# Patient Record
Sex: FEMALE | HISPANIC OR LATINO | URBAN - METROPOLITAN AREA
[De-identification: names, ages, dates, MRNs, and addresses within clinical notes are randomized per-mention and may not be internally consistent; named-entity substitution may affect disease eponyms.]

---

## 2017-02-22 ENCOUNTER — NEW PATIENT (OUTPATIENT)
Dept: URBAN - METROPOLITAN AREA CLINIC 24 | Facility: CLINIC | Age: 77
End: 2017-02-22
Payer: MEDICARE

## 2017-02-22 PROCEDURE — 92020 GONIOSCOPY: CPT | Performed by: OPHTHALMOLOGY

## 2017-02-22 PROCEDURE — 92004 COMPRE OPH EXAM NEW PT 1/>: CPT | Performed by: OPHTHALMOLOGY

## 2017-02-22 PROCEDURE — 92083 EXTENDED VISUAL FIELD XM: CPT | Performed by: OPHTHALMOLOGY

## 2017-02-22 PROCEDURE — 92250 FUNDUS PHOTOGRAPHY W/I&R: CPT | Performed by: OPHTHALMOLOGY

## 2017-02-22 PROCEDURE — 76514 ECHO EXAM OF EYE THICKNESS: CPT | Performed by: OPHTHALMOLOGY

## 2017-02-22 RX ORDER — TIMOLOL MALEATE 5 MG/ML
0.5 % SOLUTION/ DROPS OPHTHALMIC
Qty: 3 | Refills: 3 | Status: INACTIVE
Start: 2017-02-22 | End: 2021-01-22

## 2017-02-22 RX ORDER — LATANOPROST 50 UG/ML
0.005 % SOLUTION OPHTHALMIC
Qty: 3 | Refills: 3 | Status: ACTIVE
Start: 2017-02-22

## 2017-02-22 RX ORDER — BRIMONIDINE TARTRATE 2 MG/ML
0.2 % SOLUTION/ DROPS OPHTHALMIC
Qty: 3 | Refills: 3 | Status: ACTIVE
Start: 2017-02-22

## 2017-02-22 ASSESSMENT — INTRAOCULAR PRESSURE
OD: 16
OS: 18

## 2019-11-14 ENCOUNTER — FOLLOW UP ESTABLISHED (OUTPATIENT)
Dept: URBAN - METROPOLITAN AREA CLINIC 24 | Facility: CLINIC | Age: 79
End: 2019-11-14
Payer: MEDICARE

## 2019-11-14 DIAGNOSIS — H26.491 OTHER SECONDARY CATARACT, RIGHT EYE: ICD-10-CM

## 2019-11-14 DIAGNOSIS — H40.1132 PRIMARY OPEN-ANGLE GLAUCOMA, BILATERAL, MODERATE STAGE: Primary | ICD-10-CM

## 2019-11-14 PROCEDURE — 92134 CPTRZ OPH DX IMG PST SGM RTA: CPT | Performed by: OPTOMETRIST

## 2019-11-14 PROCEDURE — 92014 COMPRE OPH EXAM EST PT 1/>: CPT | Performed by: OPTOMETRIST

## 2019-11-14 ASSESSMENT — VISUAL ACUITY
OD: 20/20
OS: 20/50

## 2019-11-14 ASSESSMENT — KERATOMETRY
OD: 45.28
OS: 45.73

## 2019-12-02 ENCOUNTER — FOLLOW UP ESTABLISHED (OUTPATIENT)
Dept: URBAN - METROPOLITAN AREA CLINIC 24 | Facility: CLINIC | Age: 79
End: 2019-12-02
Payer: MEDICARE

## 2019-12-02 DIAGNOSIS — H25.812 COMBINED FORMS OF AGE-RELATED CATARACT, LEFT EYE: ICD-10-CM

## 2019-12-02 PROCEDURE — 92083 EXTENDED VISUAL FIELD XM: CPT | Performed by: OPTOMETRIST

## 2019-12-02 PROCEDURE — 92133 CPTRZD OPH DX IMG PST SGM ON: CPT | Performed by: OPTOMETRIST

## 2019-12-02 PROCEDURE — 92012 INTRM OPH EXAM EST PATIENT: CPT | Performed by: OPTOMETRIST

## 2019-12-02 RX ORDER — LEVOTHYROXINE SODIUM 200 UG/1
CAPSULE ORAL
Qty: 0 | Refills: 0 | Status: INACTIVE
Start: 2019-12-02 | End: 2020-01-02

## 2019-12-02 RX ORDER — METFORMIN HYDROCHLORIDE 500 MG/1
500 MG TABLET, FILM COATED ORAL
Qty: 0 | Refills: 0 | Status: INACTIVE
Start: 2019-12-02 | End: 2020-01-02

## 2019-12-02 ASSESSMENT — INTRAOCULAR PRESSURE
OD: 12
OS: 14

## 2020-01-02 ENCOUNTER — Encounter (OUTPATIENT)
Dept: URBAN - METROPOLITAN AREA CLINIC 24 | Facility: CLINIC | Age: 80
End: 2020-01-02
Payer: MEDICARE

## 2020-01-02 DIAGNOSIS — Z01.818 ENCOUNTER FOR OTHER PREPROCEDURAL EXAMINATION: Primary | ICD-10-CM

## 2020-01-02 PROCEDURE — 99213 OFFICE O/P EST LOW 20 MIN: CPT | Performed by: PHYSICIAN ASSISTANT

## 2020-01-02 PROCEDURE — 92025 CPTRIZED CORNEAL TOPOGRAPHY: CPT | Performed by: OPHTHALMOLOGY

## 2020-01-08 ENCOUNTER — FOLLOW UP ESTABLISHED (OUTPATIENT)
Dept: URBAN - METROPOLITAN AREA CLINIC 24 | Facility: CLINIC | Age: 80
End: 2020-01-08
Payer: MEDICARE

## 2020-01-08 DIAGNOSIS — H34.8322 TRIBUTARY (BRANCH) RETINAL VEIN OCCLUSION, LEFT EYE, STABLE: ICD-10-CM

## 2020-01-08 PROCEDURE — 92014 COMPRE OPH EXAM EST PT 1/>: CPT | Performed by: OPHTHALMOLOGY

## 2020-01-08 RX ORDER — OFLOXACIN 3 MG/ML
0.3 % SOLUTION/ DROPS OPHTHALMIC
Qty: 1 | Refills: 1 | Status: INACTIVE
Start: 2020-01-08 | End: 2022-01-14

## 2020-01-08 RX ORDER — PREDNISOLONE ACETATE 10 MG/ML
1 % SUSPENSION/ DROPS OPHTHALMIC
Qty: 1 | Refills: 1 | Status: INACTIVE
Start: 2020-01-08 | End: 2022-01-14

## 2020-01-08 RX ORDER — DICLOFENAC SODIUM 1 MG/ML
0.1 % SOLUTION/ DROPS OPHTHALMIC
Qty: 1 | Refills: 1 | Status: ACTIVE
Start: 2020-01-08

## 2020-01-08 ASSESSMENT — INTRAOCULAR PRESSURE
OD: 17
OS: 18

## 2020-01-15 ENCOUNTER — SURGERY (OUTPATIENT)
Dept: URBAN - METROPOLITAN AREA SURGERY 12 | Facility: SURGERY | Age: 80
End: 2020-01-15
Payer: MEDICARE

## 2020-01-15 PROCEDURE — 66984 XCAPSL CTRC RMVL W/O ECP: CPT | Performed by: OPHTHALMOLOGY

## 2020-01-16 ENCOUNTER — POST OP (OUTPATIENT)
Dept: URBAN - METROPOLITAN AREA CLINIC 24 | Facility: CLINIC | Age: 80
End: 2020-01-16

## 2020-01-16 PROCEDURE — 99024 POSTOP FOLLOW-UP VISIT: CPT | Performed by: OPTOMETRIST

## 2020-01-16 ASSESSMENT — INTRAOCULAR PRESSURE: OS: 12

## 2020-10-23 ENCOUNTER — OFFICE VISIT (OUTPATIENT)
Dept: URBAN - METROPOLITAN AREA CLINIC 27 | Facility: CLINIC | Age: 80
End: 2020-10-23
Payer: MEDICARE

## 2020-10-23 DIAGNOSIS — H35.372 PUCKERING OF MACULA, LEFT EYE: ICD-10-CM

## 2020-10-23 PROCEDURE — 92134 CPTRZ OPH DX IMG PST SGM RTA: CPT | Performed by: OPHTHALMOLOGY

## 2020-10-23 PROCEDURE — 92014 COMPRE OPH EXAM EST PT 1/>: CPT | Performed by: OPHTHALMOLOGY

## 2020-10-23 PROCEDURE — 67210 TREATMENT OF RETINAL LESION: CPT | Performed by: OPHTHALMOLOGY

## 2020-10-23 ASSESSMENT — INTRAOCULAR PRESSURE
OD: 15
OS: 14

## 2020-10-23 NOTE — IMPRESSION/PLAN
Impression: BRVO, OS. Status: Symptomatic.
s/p Focal laser last 06/19/2020 
s/p Avastin x 57 last 08/21/2020(consented 02/07/2020) Plan: Exam and OCT reveal mild extrafoveal CME OS (231 microns, from 345 microns). An IVFA from 02/07/2020 demonstrated no NVE. Fundus Photos from 02/07/2020 demonstrated no NVD. Recommend Avastin with focal laser series. R/B/A discussed with patient. The patient elects to proceed with Focal laser today. Thanks, Mckay Rodas. 

Return in 1 week for Avastin OS, and in 8 weeks for Re-evaluation of CME, OCT OU

## 2020-10-23 NOTE — IMPRESSION/PLAN
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 C/D 0.5, OU Plan: IOP acceptable. Continue to coordinate care. Lumigan OS. Timolol bid.

## 2020-10-30 ENCOUNTER — PROCEDURE (OUTPATIENT)
Dept: URBAN - METROPOLITAN AREA CLINIC 27 | Facility: CLINIC | Age: 80
End: 2020-10-30
Payer: MEDICARE

## 2020-10-30 PROCEDURE — 67028 INJECTION EYE DRUG: CPT | Performed by: OPHTHALMOLOGY

## 2020-10-30 ASSESSMENT — INTRAOCULAR PRESSURE
OD: 19
OS: 14

## 2020-12-11 ENCOUNTER — OFFICE VISIT (OUTPATIENT)
Dept: URBAN - METROPOLITAN AREA CLINIC 27 | Facility: CLINIC | Age: 80
End: 2020-12-11
Payer: MEDICARE

## 2020-12-11 PROCEDURE — 99024 POSTOP FOLLOW-UP VISIT: CPT | Performed by: OPHTHALMOLOGY

## 2020-12-11 PROCEDURE — 67028 INJECTION EYE DRUG: CPT | Performed by: OPHTHALMOLOGY

## 2020-12-11 PROCEDURE — 92134 CPTRZ OPH DX IMG PST SGM RTA: CPT | Performed by: OPHTHALMOLOGY

## 2020-12-11 ASSESSMENT — INTRAOCULAR PRESSURE
OS: 12
OD: 13

## 2020-12-11 NOTE — IMPRESSION/PLAN
Impression: BRVO, OS. Status: Symptomatic.
s/p Focal laser last 10/23/2020
s/p Avastin x 58 last 10/30/2020 (consented 02/07/2020) Plan: Exam and OCT reveal mild extrafoveal CME OS (231 microns, from 345 microns). An IVFA from 02/07/2020 demonstrated no NVE. Fundus Photos from 02/07/2020 demonstrated no NVD. Recommend Avastin. R/B/A discussed with patient. The patient elects to proceed with Avastin today. Thanks, Nahun Cole. 

Return in 8 weeks for Re-evaluation of CME, OCT OU

## 2021-01-22 ENCOUNTER — OFFICE VISIT (OUTPATIENT)
Dept: URBAN - METROPOLITAN AREA CLINIC 27 | Facility: CLINIC | Age: 81
End: 2021-01-22
Payer: OTHER MISCELLANEOUS

## 2021-01-22 DIAGNOSIS — H04.123 DRY EYE SYNDROME OF BILATERAL LACRIMAL GLANDS: ICD-10-CM

## 2021-01-22 PROCEDURE — 92014 COMPRE OPH EXAM EST PT 1/>: CPT | Performed by: OPHTHALMOLOGY

## 2021-01-22 PROCEDURE — 67028 INJECTION EYE DRUG: CPT | Performed by: OPHTHALMOLOGY

## 2021-01-22 PROCEDURE — 92134 CPTRZ OPH DX IMG PST SGM RTA: CPT | Performed by: OPHTHALMOLOGY

## 2021-01-22 ASSESSMENT — INTRAOCULAR PRESSURE
OD: 17
OS: 13

## 2021-01-22 NOTE — IMPRESSION/PLAN
Impression: BRVO, OS. Status: Symptomatic.
s/p Focal laser last 10/23/2020
s/p Avastin x 59 last 12/11/2020 (consented 02/07/2020) Plan: Exam and OCT reveal mild extrafoveal CME OS (213 microns, from 345 microns). An IVFA from 02/07/2020 demonstrated no NVE. Fundus Photos from 02/07/2020 demonstrated no NVD. Recommend Avastin series with focal. R/B/A discussed with patient. The patient elects to proceed with Avastin today. Thanks, Tanmay Rivas. 

Return in 2 weeks for focal OS, and in 8 weeks for Re-evaluation of CME, OCT OU

## 2021-02-19 ENCOUNTER — PROCEDURE (OUTPATIENT)
Dept: URBAN - METROPOLITAN AREA CLINIC 27 | Facility: CLINIC | Age: 81
End: 2021-02-19
Payer: OTHER MISCELLANEOUS

## 2021-02-19 PROCEDURE — 67210 TREATMENT OF RETINAL LESION: CPT | Performed by: OPHTHALMOLOGY

## 2021-02-19 ASSESSMENT — INTRAOCULAR PRESSURE
OS: 12
OD: 14

## 2021-04-16 ENCOUNTER — OFFICE VISIT (OUTPATIENT)
Dept: URBAN - METROPOLITAN AREA CLINIC 27 | Facility: CLINIC | Age: 81
End: 2021-04-16
Payer: OTHER MISCELLANEOUS

## 2021-04-16 DIAGNOSIS — H01.004 UNSPECIFIED BLEPHARITIS LEFT UPPER EYELID: ICD-10-CM

## 2021-04-16 PROCEDURE — 92134 CPTRZ OPH DX IMG PST SGM RTA: CPT | Performed by: OPHTHALMOLOGY

## 2021-04-16 PROCEDURE — 67028 INJECTION EYE DRUG: CPT | Performed by: OPHTHALMOLOGY

## 2021-04-16 PROCEDURE — 99024 POSTOP FOLLOW-UP VISIT: CPT | Performed by: OPHTHALMOLOGY

## 2021-04-16 ASSESSMENT — INTRAOCULAR PRESSURE
OD: 13
OS: 11

## 2021-04-16 NOTE — IMPRESSION/PLAN
Impression: BRVO, OS. Status: Symptomatic.
s/p Focal laser last 02/19/2021 
s/p Avastin x 60  last 01/22/2021  Plan: Exam and OCT reveal mild extrafoveal CME OS (279 microns, from 345 microns). An IVFA from 02/07/2020 demonstrated no NVE. Fundus Photos from 02/07/2020 demonstrated no NVD. Recommend Avastin. R/B/A discussed with patient. The patient elects to proceed with Avastin today. Thanks, Shannan Prescott. 

Return in 8 weeks for Re-evaluation of CME, OCT OU

## 2021-06-04 ENCOUNTER — OFFICE VISIT (OUTPATIENT)
Dept: URBAN - METROPOLITAN AREA CLINIC 27 | Facility: CLINIC | Age: 81
End: 2021-06-04
Payer: OTHER MISCELLANEOUS

## 2021-06-04 DIAGNOSIS — H43.811 VITREOUS DEGENERATION, RIGHT EYE: ICD-10-CM

## 2021-06-04 DIAGNOSIS — Z96.1 PRESENCE OF INTRAOCULAR LENS: ICD-10-CM

## 2021-06-04 DIAGNOSIS — H35.81 RETINAL EDEMA: ICD-10-CM

## 2021-06-04 DIAGNOSIS — H35.3231 EXUDATIVE AGE-RELATED MACULAR DEGENERATION, BILATERAL, WITH ACTIVE CHOROIDAL NEOVASCULARIZATION: ICD-10-CM

## 2021-06-04 PROCEDURE — 92014 COMPRE OPH EXAM EST PT 1/>: CPT | Performed by: OPHTHALMOLOGY

## 2021-06-04 PROCEDURE — 92134 CPTRZ OPH DX IMG PST SGM RTA: CPT | Performed by: OPHTHALMOLOGY

## 2021-06-04 PROCEDURE — 99214 OFFICE O/P EST MOD 30 MIN: CPT | Performed by: OPHTHALMOLOGY

## 2021-06-04 PROCEDURE — 67210 TREATMENT OF RETINAL LESION: CPT | Performed by: OPHTHALMOLOGY

## 2021-06-04 ASSESSMENT — INTRAOCULAR PRESSURE
OS: 7
OD: 15

## 2021-06-04 NOTE — IMPRESSION/PLAN
Impression: BRVO, OS. Status: Symptomatic.
s/p Focal laser last 02/19/2021 
s/p Avastin x 61  last 04/16/2021  Plan: Exam and OCT reveal mild extrafoveal CME OS (236 microns, from 345 microns). An IVFA from 02/07/2020 demonstrated no NVE. Fundus Photos from 02/07/2020 demonstrated no NVD. Recommend Avastin with focal series. R/B/A discussed with patient. The patient elects to proceed with focal today. Thanks, Barak Nugent. 

Return in 4 weeks for Avastin OS, and in 8 weeks for Re-evaluation of CME, OCT OU

## 2021-08-04 ENCOUNTER — PROCEDURE (OUTPATIENT)
Dept: URBAN - METROPOLITAN AREA CLINIC 27 | Facility: CLINIC | Age: 81
End: 2021-08-04
Payer: OTHER MISCELLANEOUS

## 2021-08-04 PROCEDURE — 67028 INJECTION EYE DRUG: CPT | Performed by: OPHTHALMOLOGY

## 2021-08-04 PROCEDURE — 92134 CPTRZ OPH DX IMG PST SGM RTA: CPT | Performed by: OPHTHALMOLOGY

## 2021-08-04 PROCEDURE — 92014 COMPRE OPH EXAM EST PT 1/>: CPT | Performed by: OPHTHALMOLOGY

## 2021-08-04 ASSESSMENT — INTRAOCULAR PRESSURE
OD: 23
OS: 15

## 2021-08-04 NOTE — IMPRESSION/PLAN
Impression: BRVO, OS. Status: Symptomatic.
s/p Focal laser last 06/04/2021
s/p Avastin x 61  last 04/16/2021 Plan: Exam and OCT reveal mild extrafoveal CME OS (268 microns, from 345 microns). An IVFA from 02/07/2020 demonstrated no NVE. Fundus Photos from 02/07/2020 demonstrated no NVD. Recommend Avastin. R/B/A discussed with patient. The patient elects to proceed with Avastin today. Thanks, Tanmay Rivas. 

Return in 8 weeks for Re-evaluation of CME, OCT OU, IVFA OS 1st

## 2021-09-15 ENCOUNTER — OFFICE VISIT (OUTPATIENT)
Dept: URBAN - METROPOLITAN AREA CLINIC 27 | Facility: CLINIC | Age: 81
End: 2021-09-15
Payer: OTHER MISCELLANEOUS

## 2021-09-15 PROCEDURE — 92014 COMPRE OPH EXAM EST PT 1/>: CPT | Performed by: OPHTHALMOLOGY

## 2021-09-15 PROCEDURE — 67210 TREATMENT OF RETINAL LESION: CPT | Performed by: OPHTHALMOLOGY

## 2021-09-15 PROCEDURE — 92134 CPTRZ OPH DX IMG PST SGM RTA: CPT | Performed by: OPHTHALMOLOGY

## 2021-09-15 PROCEDURE — 92235 FLUORESCEIN ANGRPH MLTIFRAME: CPT | Performed by: OPHTHALMOLOGY

## 2021-09-15 PROCEDURE — 99214 OFFICE O/P EST MOD 30 MIN: CPT | Performed by: OPHTHALMOLOGY

## 2021-09-15 ASSESSMENT — INTRAOCULAR PRESSURE
OD: 15
OS: 9

## 2021-09-15 NOTE — IMPRESSION/PLAN
Impression: BRVO, OS. Status: Symptomatic.
s/p Focal laser last 06/04/2021
s/p Avastin x 62  last 08/04/2021  Plan: Exam and OCT reveal mild extrafoveal CME OS (268 microns, from 345 microns). An IVFA from 09/15/2021 demonstrated no NVE. Fundus Photos from 09/15/2021 demonstrated no NVD. Recommend focal with Avastin series. R/B/A discussed with patient. The patient elects to proceed with focal today. Thanks, Renetta Olivo. 

Return in 2 weeks Avastin OS, and in  8 weeks for Re-evaluation of CME, OCT OU

## 2021-09-29 ENCOUNTER — PROCEDURE (OUTPATIENT)
Dept: URBAN - METROPOLITAN AREA CLINIC 27 | Facility: CLINIC | Age: 81
End: 2021-09-29
Payer: OTHER MISCELLANEOUS

## 2021-09-29 PROCEDURE — 67028 INJECTION EYE DRUG: CPT | Performed by: OPHTHALMOLOGY

## 2021-09-29 ASSESSMENT — INTRAOCULAR PRESSURE
OD: 12
OS: 12

## 2021-11-10 ENCOUNTER — OFFICE VISIT (OUTPATIENT)
Dept: URBAN - METROPOLITAN AREA CLINIC 27 | Facility: CLINIC | Age: 81
End: 2021-11-10
Payer: OTHER MISCELLANEOUS

## 2021-11-10 PROCEDURE — 99024 POSTOP FOLLOW-UP VISIT: CPT | Performed by: OPHTHALMOLOGY

## 2021-11-10 PROCEDURE — 92134 CPTRZ OPH DX IMG PST SGM RTA: CPT | Performed by: OPHTHALMOLOGY

## 2021-11-10 PROCEDURE — 67028 INJECTION EYE DRUG: CPT | Performed by: OPHTHALMOLOGY

## 2021-11-10 NOTE — IMPRESSION/PLAN
Impression: BRVO, OS. Status: Symptomatic.
s/p Focal laser last 09/15/2021 
s/p Avastin x 63  last 09/29/2021 Plan: Exam and OCT reveal mild extrafoveal CME OS (235 microns, from 345 microns). An IVFA from 09/15/2021 demonstrated no NVE. Fundus Photos from 09/15/2021 demonstrated no NVD. Recommend Avastin. R/B/A discussed with patient. The risks of Avastin were discussed, including off-label use and compounding pharmacy risks, and the patient elects to proceed with Avastin. After 2% Subconjunctival anesthesia & betadine prep, 1.25mg of Avastin was injected in the eye. Cold compress and Tylenol suggested for pain if needed. Thanks, Madison Adkins. 

Return in  8 weeks for Re-evaluation of CME, OCT OU

## 2022-01-14 ENCOUNTER — PROCEDURE (OUTPATIENT)
Dept: URBAN - METROPOLITAN AREA CLINIC 27 | Facility: CLINIC | Age: 82
End: 2022-01-14
Payer: OTHER MISCELLANEOUS

## 2022-01-14 DIAGNOSIS — H40.052 OCULAR HYPERTENSION, LEFT EYE: ICD-10-CM

## 2022-01-14 DIAGNOSIS — H34.8320 TRIB RTNL VEIN OCCLUSION, LEFT EYE, WITH MACULAR EDEMA: Primary | ICD-10-CM

## 2022-01-14 PROCEDURE — 99214 OFFICE O/P EST MOD 30 MIN: CPT | Performed by: OPHTHALMOLOGY

## 2022-01-14 PROCEDURE — 67210 TREATMENT OF RETINAL LESION: CPT | Performed by: OPHTHALMOLOGY

## 2022-01-14 PROCEDURE — 92134 CPTRZ OPH DX IMG PST SGM RTA: CPT | Performed by: OPHTHALMOLOGY

## 2022-01-14 ASSESSMENT — INTRAOCULAR PRESSURE
OD: 11
OS: 14

## 2022-01-14 NOTE — IMPRESSION/PLAN
Impression: BRVO, OS. Status: Symptomatic.
s/p Focal laser last 09/15/2021 
s/p Avastin x 64  last 11/10/2021 Plan: Exam and OCT reveal mild extrafoveal CME OS (239 microns, from 345 microns). An IVFA from 09/15/2021 demonstrated no NVE. Fundus Photos from 09/15/2021 demonstrated no NVD. Recommend Avastin series with Focal laser. RBA discussed. The patient elects focal OS. Return in 1 week for Avastin OS, and in 8 weeks for Re-evaluation of CME, OCT OU

## 2022-01-21 ENCOUNTER — PROCEDURE (OUTPATIENT)
Dept: URBAN - METROPOLITAN AREA CLINIC 27 | Facility: CLINIC | Age: 82
End: 2022-01-21
Payer: OTHER MISCELLANEOUS

## 2022-01-21 PROCEDURE — 67028 INJECTION EYE DRUG: CPT | Performed by: OPHTHALMOLOGY

## 2022-01-21 ASSESSMENT — INTRAOCULAR PRESSURE
OD: 14
OS: 11

## 2022-03-04 ENCOUNTER — OFFICE VISIT (OUTPATIENT)
Dept: URBAN - METROPOLITAN AREA CLINIC 27 | Facility: CLINIC | Age: 82
End: 2022-03-04
Payer: OTHER MISCELLANEOUS

## 2022-03-04 PROCEDURE — 99024 POSTOP FOLLOW-UP VISIT: CPT | Performed by: OPHTHALMOLOGY

## 2022-03-04 PROCEDURE — 92134 CPTRZ OPH DX IMG PST SGM RTA: CPT | Performed by: OPHTHALMOLOGY

## 2022-03-04 PROCEDURE — 67028 INJECTION EYE DRUG: CPT | Performed by: OPHTHALMOLOGY

## 2022-03-04 ASSESSMENT — INTRAOCULAR PRESSURE
OS: 15
OD: 15

## 2022-03-04 NOTE — IMPRESSION/PLAN
Impression: BRVO, OS. Status: Symptomatic.
s/p Focal laser last 09/15/2021 
s/p Avastin x 65  last 01/21/2022 Plan: Exam and OCT reveal mild extrafoveal CME OS (233 microns, from 345 microns). An IVFA from 09/15/2021 demonstrated no NVE. Fundus Photos from 09/15/2021 demonstrated no NVD. Recommend Avastin series with Focal laser. 

Return in  8 weeks for Re-evaluation of CME, OCT OU

## 2022-04-29 ENCOUNTER — OFFICE VISIT (OUTPATIENT)
Dept: URBAN - METROPOLITAN AREA CLINIC 27 | Facility: CLINIC | Age: 82
End: 2022-04-29
Payer: OTHER MISCELLANEOUS

## 2022-04-29 DIAGNOSIS — H35.372 PUCKERING OF MACULA, LEFT EYE: ICD-10-CM

## 2022-04-29 DIAGNOSIS — H35.81 RETINAL EDEMA: ICD-10-CM

## 2022-04-29 DIAGNOSIS — Z96.1 PRESENCE OF INTRAOCULAR LENS: ICD-10-CM

## 2022-04-29 DIAGNOSIS — H43.811 VITREOUS DEGENERATION, RIGHT EYE: ICD-10-CM

## 2022-04-29 DIAGNOSIS — H01.004 UNSPECIFIED BLEPHARITIS LEFT UPPER EYELID: ICD-10-CM

## 2022-04-29 PROCEDURE — 92014 COMPRE OPH EXAM EST PT 1/>: CPT | Performed by: OPHTHALMOLOGY

## 2022-04-29 PROCEDURE — 67210 TREATMENT OF RETINAL LESION: CPT | Performed by: OPHTHALMOLOGY

## 2022-04-29 ASSESSMENT — INTRAOCULAR PRESSURE
OS: 15
OD: 14

## 2022-04-29 NOTE — IMPRESSION/PLAN
Impression: BRVO, OS. Status: Symptomatic.
s/p Focal laser last 09/15/2021 
s/p Avastin x 66 last 03/04/2022 Plan: Exam and OCT reveal mild extrafoveal CME OS (233 microns, from 345 microns). An IVFA from 09/15/2021 demonstrated no NVE. Fundus Photos from 09/15/2021 demonstrated no NVD. Recommend Avastin series with Focal laser. RBA discussed. The patient elects focal OS. Return in 1 week for Avastin OS, and in 8 weeks for Re-evaluation of CME, OCT OU, IVFA OS 1st

## 2022-05-06 ENCOUNTER — PROCEDURE (OUTPATIENT)
Dept: URBAN - METROPOLITAN AREA CLINIC 27 | Facility: CLINIC | Age: 82
End: 2022-05-06
Payer: OTHER MISCELLANEOUS

## 2022-05-06 DIAGNOSIS — H34.8320 TRIBUTARY (BRANCH) RETINAL VEIN OCCLUSION, LEFT EYE, WITH MACULAR EDEMA: Primary | ICD-10-CM

## 2022-05-06 PROCEDURE — 67028 INJECTION EYE DRUG: CPT | Performed by: OPHTHALMOLOGY

## 2022-05-06 ASSESSMENT — INTRAOCULAR PRESSURE
OD: 13
OS: 12

## 2022-06-24 ENCOUNTER — OFFICE VISIT (OUTPATIENT)
Dept: URBAN - METROPOLITAN AREA CLINIC 27 | Facility: CLINIC | Age: 82
End: 2022-06-24
Payer: OTHER MISCELLANEOUS

## 2022-06-24 DIAGNOSIS — H35.81 RETINAL EDEMA: ICD-10-CM

## 2022-06-24 DIAGNOSIS — H34.8320 TRIB RTNL VEIN OCCLUSION, LEFT EYE, WITH MACULAR EDEMA: Primary | ICD-10-CM

## 2022-06-24 DIAGNOSIS — H43.811 VITREOUS DEGENERATION, RIGHT EYE: ICD-10-CM

## 2022-06-24 DIAGNOSIS — H01.004 UNSPECIFIED BLEPHARITIS LEFT UPPER EYELID: ICD-10-CM

## 2022-06-24 DIAGNOSIS — Z96.1 PRESENCE OF INTRAOCULAR LENS: ICD-10-CM

## 2022-06-24 DIAGNOSIS — H35.372 PUCKERING OF MACULA, LEFT EYE: ICD-10-CM

## 2022-06-24 PROCEDURE — 92134 CPTRZ OPH DX IMG PST SGM RTA: CPT | Performed by: OPHTHALMOLOGY

## 2022-06-24 PROCEDURE — 67028 INJECTION EYE DRUG: CPT | Performed by: OPHTHALMOLOGY

## 2022-06-24 PROCEDURE — 99024 POSTOP FOLLOW-UP VISIT: CPT | Performed by: OPHTHALMOLOGY

## 2022-06-24 PROCEDURE — 92235 FLUORESCEIN ANGRPH MLTIFRAME: CPT | Performed by: OPHTHALMOLOGY

## 2022-06-24 ASSESSMENT — INTRAOCULAR PRESSURE
OD: 15
OS: 13

## 2022-06-24 NOTE — IMPRESSION/PLAN
Impression: BRVO, OS. Status: Symptomatic.
s/p Focal laser last 04/29/2022
s/p Avastin x 67 last 05/06/2022 Plan: Exam and OCT reveal mild extrafoveal CME OS (246 microns, from 345 microns). An IVFA from 06/24/2022 demonstrated no NVE. Fundus Photos from 06/24/2022 demonstrated no NVD. Recommend Avastin. RBA discussed. The patient elects Avastin OS. Return in 8 weeks for Re-evaluation of CME, OCT OU

## 2022-08-26 ENCOUNTER — OFFICE VISIT (OUTPATIENT)
Dept: URBAN - METROPOLITAN AREA CLINIC 27 | Facility: CLINIC | Age: 82
End: 2022-08-26
Payer: OTHER MISCELLANEOUS

## 2022-08-26 DIAGNOSIS — H34.8320 TRIB RTNL VEIN OCCLUSION, LEFT EYE, WITH MACULAR EDEMA: Primary | ICD-10-CM

## 2022-08-26 DIAGNOSIS — H35.372 PUCKERING OF MACULA, LEFT EYE: ICD-10-CM

## 2022-08-26 DIAGNOSIS — H35.81 RETINAL EDEMA: ICD-10-CM

## 2022-08-26 DIAGNOSIS — H01.004 UNSPECIFIED BLEPHARITIS LEFT UPPER EYELID: ICD-10-CM

## 2022-08-26 DIAGNOSIS — H43.811 VITREOUS DEGENERATION, RIGHT EYE: ICD-10-CM

## 2022-08-26 DIAGNOSIS — Z96.1 PRESENCE OF INTRAOCULAR LENS: ICD-10-CM

## 2022-08-26 PROCEDURE — 92134 CPTRZ OPH DX IMG PST SGM RTA: CPT | Performed by: OPHTHALMOLOGY

## 2022-08-26 PROCEDURE — 67028 INJECTION EYE DRUG: CPT | Performed by: OPHTHALMOLOGY

## 2022-08-26 PROCEDURE — 92014 COMPRE OPH EXAM EST PT 1/>: CPT | Performed by: OPHTHALMOLOGY

## 2022-08-26 ASSESSMENT — INTRAOCULAR PRESSURE
OD: 18
OS: 21

## 2022-08-26 NOTE — IMPRESSION/PLAN
Impression: BRVO, OS. Status: Symptomatic.
s/p Focal laser last 04/29/2022
s/p Avastin x 68 last 06/24/2022 Plan: Exam and OCT reveal mild extrafoveal CME OS (251 microns, from 345 microns). An IVFA from 06/24/2022 demonstrated no NVE. Fundus Photos from 06/24/2022 demonstrated no NVD. Recommend Avastin. RBA discussed. The patient elects Avastin OS. Return in 8 weeks for Re-evaluation of CME, OCT OU

## 2023-08-16 ENCOUNTER — OFFICE VISIT (OUTPATIENT)
Dept: URBAN - METROPOLITAN AREA CLINIC 27 | Facility: CLINIC | Age: 83
End: 2023-08-16
Payer: OTHER MISCELLANEOUS

## 2023-08-16 DIAGNOSIS — H35.81 RETINAL EDEMA: ICD-10-CM

## 2023-08-16 DIAGNOSIS — H35.372 PUCKERING OF MACULA, LEFT EYE: ICD-10-CM

## 2023-08-16 DIAGNOSIS — H43.811 VITREOUS DEGENERATION, RIGHT EYE: ICD-10-CM

## 2023-08-16 DIAGNOSIS — Z96.1 PRESENCE OF INTRAOCULAR LENS: ICD-10-CM

## 2023-08-16 DIAGNOSIS — H01.004 UNSPECIFIED BLEPHARITIS LEFT UPPER EYELID: ICD-10-CM

## 2023-08-16 DIAGNOSIS — H34.8320 TRIBUTARY (BRANCH) RETINAL VEIN OCCLUSION, LEFT EYE, WITH MACULAR EDEMA: Primary | ICD-10-CM

## 2023-08-16 DIAGNOSIS — H40.9 GLAUCOMA: ICD-10-CM

## 2023-08-16 PROCEDURE — 92134 CPTRZ OPH DX IMG PST SGM RTA: CPT | Performed by: OPHTHALMOLOGY

## 2023-08-16 PROCEDURE — 92014 COMPRE OPH EXAM EST PT 1/>: CPT | Performed by: OPHTHALMOLOGY

## 2024-11-13 ENCOUNTER — OFFICE VISIT (OUTPATIENT)
Dept: URBAN - METROPOLITAN AREA CLINIC 24 | Facility: CLINIC | Age: 84
End: 2024-11-13
Payer: OTHER MISCELLANEOUS

## 2024-11-13 DIAGNOSIS — H40.1111 PRIMARY OPEN-ANGLE GLAUCOMA, RIGHT EYE, MILD STAGE: ICD-10-CM

## 2024-11-13 DIAGNOSIS — H40.1122 PRIMARY OPEN-ANGLE GLAUCOMA, LEFT EYE, MODERATE STAGE: Primary | ICD-10-CM

## 2024-11-13 DIAGNOSIS — Z96.1 PRESENCE OF INTRAOCULAR LENS: ICD-10-CM

## 2024-11-13 PROCEDURE — 99204 OFFICE O/P NEW MOD 45 MIN: CPT | Performed by: OPHTHALMOLOGY

## 2024-11-13 ASSESSMENT — INTRAOCULAR PRESSURE
OD: 22
OD: 16
OS: 16

## 2024-11-13 ASSESSMENT — VISUAL ACUITY
OS: 20/30
OD: 20/25